# Patient Record
Sex: FEMALE | Race: WHITE
[De-identification: names, ages, dates, MRNs, and addresses within clinical notes are randomized per-mention and may not be internally consistent; named-entity substitution may affect disease eponyms.]

---

## 2019-09-30 ENCOUNTER — HOSPITAL ENCOUNTER (OUTPATIENT)
Dept: HOSPITAL 53 - M SDC | Age: 39
Discharge: HOME | End: 2019-09-30
Attending: ORTHOPAEDIC SURGERY
Payer: COMMERCIAL

## 2019-09-30 VITALS — HEIGHT: 66 IN | BODY MASS INDEX: 26.52 KG/M2 | WEIGHT: 165 LBS

## 2019-09-30 VITALS — DIASTOLIC BLOOD PRESSURE: 70 MMHG | SYSTOLIC BLOOD PRESSURE: 104 MMHG

## 2019-09-30 DIAGNOSIS — M75.41: ICD-10-CM

## 2019-09-30 DIAGNOSIS — Z79.899: ICD-10-CM
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
## 2019-09-30 DIAGNOSIS — S43.431A: Primary | ICD-10-CM

## 2019-09-30 DIAGNOSIS — M75.111: ICD-10-CM

## 2019-09-30 DIAGNOSIS — F17.210: ICD-10-CM

## 2019-09-30 PROCEDURE — 64415 NJX AA&/STRD BRCH PLXS IMG: CPT

## 2019-09-30 PROCEDURE — 29826 SHO ARTHRS SRG DECOMPRESSION: CPT

## 2019-09-30 PROCEDURE — 29807 SHO ARTHRS SRG RPR SLAP LES: CPT

## 2019-10-07 NOTE — RO
DATE OF PROCEDURE:  09/30/2019

 

PREOPERATIVE DIAGNOSES:

1.  Right shoulder posterior labral tear.

2.  Right shoulder partial thickness rotator cuff tear.

3.  Right shoulder impingement.

 

POSTOPERATIVE DIAGNOSES:

1.  Right shoulder anterior and posterior labral tear.

2.  Right shoulder partial articular rotator cuff tear.

3.  Right shoulder impingement.

 

PROCEDURE:

1.  Right shoulder arthroscopic anterior and posterior labral repair.

2.  Right shoulder arthroscopic rotator cuff debridement.

3.  Right shoulder subacromial decompression.

 

SURGEON:  Dr. Alec Christian

 

ASSISTANT:  Aston Bashir PA-C

 

ANESTHESIA:  General with preoperative nerve block.

 

IV FLUIDS:  Lactated Ringer's.

 

ESTIMATED BLOOD LOSS:  Less than 10 mL.

 

IMPLANTS:  Arthrex 2.9 mm PushLock anchor times three and Arthrex 3 mm SutureTak

times one.

 

CLOSURE:  Nylon.

 

PROCEDURE:  The patient was identified in the preoperative holding area.  The

right shoulder marked by myself.  She had an interscalene nerve block by

anesthesia.  She was brought the operating room, placed supine on a well padded

OR table.  General anesthesia was induced.  Exam under anesthesia revealed 180

degrees of forward flexion, 90 of external rotation, a grade 2 anterior load and

shift and a grade 2 posterior load and shift.  She was then placed into the left

side down lateral decubitus position with an axillary roll and all bony

prominences were well padded.  Bilateral Venodyne boots for deep vein thrombosis

(DVT) prophylaxis.  The right arm was placed into the Arthrex STaR Sleeve lateral

decubitus traction kuhn with 10 pounds of traction.  The right shoulder was

then prepped and draped in the normal sterile fashion with ChloraPrep.  Again,

she received appropriate IV antibiotics.  Prior to incision, a time out was

performed per hospital protocol.

 

The right shoulder was insufflated with Lactated Ringer's and a 30 degree

arthroscope introduced into the joint.  A diagnostic arthroscopy was carried out

revealing split tearing of both the anterior inferior labrum then extending

around the 6 o'clock position into the posterior labrum.  There was a positive

drive-through sign.  There was a partial articular rotator cuff tear involving

the supraspinatus.  Posterior rotator cuff and the subscapularis were intact.

Long head of the biceps was unremarkable.

 

An anterior working portal was established in the rotator interval and on probing

the superior labrum, there were no unstable tears.  Preoperatively, this was felt

to be more of a posterior instability picture, intraoperatively this was an

inferior instability and this required anchors both anteriorly and posteriorly.

A second portal was established through the rotator interval and the hooked

radiofrequency cautery used to develop the plane between anterior inferior labrum

and the glenoid.  Labral elevators then used to subperiosteally dissect along the

anterior inferior glenoid neck.  A shaver was used to clear soft tissue off bone

and then a Ring Curette used to create a bleeding surface.  An Arthrex 3 mm

SutureTak was then placed in the anterior inferior glenoid and the TigerWire

sutures were passed in a horizontal mattress fashion, using a SutureLasso and

piercing the anterior band of the inferior glenohumeral ligament.  My assistant

applied a posterior force to the proximal humerus and then arthroscopic knots

were tied by hand using alternating half hitches.  This nicely restored the

anterior inferior bumper.  I then used the SutureLasso to shuttle LabralTape

through the capsule and labrum, just superior to the first set of sutures.  Those

were loaded through a 2.9 mm PushLock anchor.  The appropriate drill bit was used

to create a socket in the glenoid just proximal to initial anchor and then the

sutures were tensioned, the anchor was inserted and then malleted per routine.

This had excellent fixation.  The sutures were cut with the arthroscopic knot

cutter.  This completed the anterior labral repair portion of the case.

 

Next, attention was turned to the posterior labrum were again there was tearing

in the posterior inferior aspect and the labral elevator was passed from the

anterior cannula then used to subperiosteally elevate capsule and labrum

posterior and inferior.  The percutaneous labral repair kit was then used to

create an accessory post lateral portal, was given much better angle for

drilling.  Suture lasso was used to shuttle LabralTape through the far posterior

inferior capsule and labrum, that was loaded through a PushLock anchor with the

appropriate drill used to create a socket in the posterior inferior glenoid and

the anchor was inserted per routine with excellent fixation.  Sutures cut with

the .  This was repeated for a second posterior PushLock anchor just

superior to the first and this also has excellent fixation.  This nicely restored

the posterior inferior bumper.  The humeral head was now essentially located on

the glenoid.  The shaver was then used to perform a debridement of the partial

articular supraspinatus tear.  This was felt to be less than 25% of the

thickness.  The long head of the biceps had already been brought into the joint.

There was no indication for a tenotomy or tenodesis.

 

The arthroscope was withdrawn from the joint, placed in the subacromial space and

a lateral working portal was established.  Bursectomy performed with shaver and

cautery.  No significant tearing of the supraspinatus.  There was a large

subacromial spur and I performed a formal acromioplasty with the bur to turn this

into a type 1 morphology.  The subacromial space was irrigated and drained.

Portals closed nylon suture.  Bulky sterile dressing applied.  She was placed

into her ARC 2 sling and then awoken from general anesthesia and transferred to

the postanesthesia care unit (PACU).  All counts were correct times two.

Complications none.